# Patient Record
Sex: FEMALE | Race: WHITE | NOT HISPANIC OR LATINO | Employment: FULL TIME | URBAN - METROPOLITAN AREA
[De-identification: names, ages, dates, MRNs, and addresses within clinical notes are randomized per-mention and may not be internally consistent; named-entity substitution may affect disease eponyms.]

---

## 2020-05-08 ENCOUNTER — OFFICE VISIT (OUTPATIENT)
Dept: PREADMISSION TESTING | Facility: HOSPITAL | Age: 45
End: 2020-05-08

## 2020-05-08 LAB
REF LAB TEST METHOD: NORMAL
SARS-COV-2 RNA RESP QL NAA+PROBE: NOT DETECTED

## 2020-05-08 PROCEDURE — U0002 COVID-19 LAB TEST NON-CDC: HCPCS | Performed by: INTERNAL MEDICINE

## 2020-05-08 PROCEDURE — U0004 COV-19 TEST NON-CDC HGH THRU: HCPCS | Performed by: INTERNAL MEDICINE

## 2020-05-11 ENCOUNTER — LAB REQUISITION (OUTPATIENT)
Dept: LAB | Facility: HOSPITAL | Age: 45
End: 2020-05-11

## 2020-05-11 DIAGNOSIS — N93.8 OTHER SPECIFIED ABNORMAL UTERINE AND VAGINAL BLEEDING: ICD-10-CM

## 2020-05-11 PROCEDURE — 88305 TISSUE EXAM BY PATHOLOGIST: CPT | Performed by: OBSTETRICS & GYNECOLOGY

## 2020-05-12 LAB
CYTO UR: NORMAL
LAB AP CASE REPORT: NORMAL
LAB AP CLINICAL INFORMATION: NORMAL
PATH REPORT.FINAL DX SPEC: NORMAL
PATH REPORT.GROSS SPEC: NORMAL

## 2021-08-12 ENCOUNTER — OFFICE VISIT (OUTPATIENT)
Dept: OBSTETRICS AND GYNECOLOGY | Facility: CLINIC | Age: 46
End: 2021-08-12

## 2021-08-12 VITALS
SYSTOLIC BLOOD PRESSURE: 110 MMHG | OXYGEN SATURATION: 99 % | WEIGHT: 113.6 LBS | RESPIRATION RATE: 20 BRPM | HEART RATE: 90 BPM | DIASTOLIC BLOOD PRESSURE: 76 MMHG

## 2021-08-12 DIAGNOSIS — Z12.39 ENCOUNTER FOR BREAST CANCER SCREENING USING NON-MAMMOGRAM MODALITY: ICD-10-CM

## 2021-08-12 DIAGNOSIS — Z80.3 FAMILY HISTORY OF BREAST CANCER: ICD-10-CM

## 2021-08-12 DIAGNOSIS — Z71.85 HPV VACCINE COUNSELING: ICD-10-CM

## 2021-08-12 DIAGNOSIS — Z01.419 ENCOUNTER FOR ANNUAL ROUTINE GYNECOLOGICAL EXAMINATION: Primary | ICD-10-CM

## 2021-08-12 PROCEDURE — 99396 PREV VISIT EST AGE 40-64: CPT | Performed by: OBSTETRICS & GYNECOLOGY

## 2021-08-12 RX ORDER — DOXYCYCLINE HYCLATE 100 MG
100 TABLET ORAL AS NEEDED
COMMUNITY

## 2021-08-12 RX ORDER — HYDROXYZINE HYDROCHLORIDE 10 MG/1
10 TABLET, FILM COATED ORAL 2 TIMES DAILY
COMMUNITY
Start: 2021-08-11

## 2021-08-12 RX ORDER — ONDANSETRON 4 MG/1
4 TABLET, FILM COATED ORAL AS NEEDED
COMMUNITY

## 2021-08-12 RX ORDER — TRAMADOL HYDROCHLORIDE 50 MG/1
50 TABLET ORAL AS NEEDED
COMMUNITY

## 2021-08-12 RX ORDER — LEVOTHYROXINE SODIUM 112 UG/1
112 TABLET ORAL DAILY
COMMUNITY

## 2021-08-12 RX ORDER — HYDROXYCHLOROQUINE SULFATE 200 MG/1
200 TABLET, FILM COATED ORAL DAILY
COMMUNITY

## 2021-08-12 RX ORDER — DICLOFENAC SODIUM 75 MG/1
75 TABLET, DELAYED RELEASE ORAL ONCE AS NEEDED
COMMUNITY

## 2021-08-12 RX ORDER — METHYLPREDNISOLONE 4 MG/1
4 TABLET ORAL DAILY
COMMUNITY
Start: 2021-04-02

## 2021-08-12 RX ORDER — NYSTATIN 500000 [USP'U]/1
500000 TABLET, COATED ORAL AS NEEDED
COMMUNITY

## 2021-08-12 RX ORDER — CYCLOSPORINE 0.5 MG/ML
1 EMULSION OPHTHALMIC 2 TIMES DAILY
COMMUNITY

## 2021-08-12 RX ORDER — AZITHROMYCIN 250 MG/1
250 TABLET, FILM COATED ORAL DAILY
COMMUNITY
Start: 2021-05-17

## 2021-08-12 NOTE — PROGRESS NOTES
GYN Annual Exam     CC - Here for annual exam.     Subjective   HPI  Flor Ley is a 46 y.o. female, , who presents for annual well woman exam. Patient's last menstrual period was 2021 (within days)..  Periods are regular every 25-35 days, lasting 5 days.  Dysmenorrhea:none.  Partner Status: Marital Status: .  New Partners since last visit: no.  Desires STD Screening: no.  Patient has not had the HPV vaccine.     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        History of abnormal Pap smear: yes  Family history of uterine, colon, breast, or ovarian cancer: yes - breast cancer and colon cancer   Previous Mammogram :  yes - 2020 per patient   Performs monthly Self-Breast Exam: yes  Exercises Regularly:yes  Feelings of Anxiety or Depression: no  Tobacco Usage?: No   OB History        1    Para   1    Term                AB        Living   1       SAB        TAB        Ectopic        Molar        Multiple        Live Births   1                Health Maintenance   Topic Date Due   • Annual Gynecologic Pelvic and Breast Exam  Never done   • COLORECTAL CANCER SCREENING  Never done   • ANNUAL PHYSICAL  Never done   • HEPATITIS C SCREENING  Never done   • PAP SMEAR  Never done   • INFLUENZA VACCINE  10/01/2021   • TDAP/TD VACCINES (2 - Td or Tdap) 2022   • COVID-19 Vaccine  Completed   • Pneumococcal Vaccine 0-64  Aged Out           The additional following portions of the patient's history were reviewed and updated as appropriate: allergies, current medications, past family history, past medical history, past social history, past surgical history and problem  list.    Review of Systems   Constitutional: Negative.    HENT: Negative.    Eyes: Negative.    Respiratory: Negative.    Cardiovascular: Negative.    Gastrointestinal: Negative.    Endocrine: Negative.    Genitourinary: Negative.    Musculoskeletal: Negative.    Allergic/Immunologic: Negative.    Neurological: Negative.    Hematological: Negative.    Psychiatric/Behavioral: Negative.        I have reviewed and agree with the HPI, ROS, and historical information as entered above. January Baca MD    Objective   /76   Pulse 90   Resp 20   Wt 51.5 kg (113 lb 9.6 oz)   LMP 07/18/2021 (Within Days)   SpO2 99%   Breastfeeding No     Physical Exam  Vitals and nursing note reviewed. Exam conducted with a chaperone present.   Constitutional:       Appearance: She is well-developed.   HENT:      Head: Normocephalic and atraumatic.   Neck:      Thyroid: No thyroid mass or thyromegaly.   Cardiovascular:      Rate and Rhythm: Normal rate and regular rhythm.      Heart sounds: No murmur heard.     Pulmonary:      Effort: Pulmonary effort is normal. No retractions.      Breath sounds: Normal breath sounds. No wheezing, rhonchi or rales.   Chest:      Chest wall: No mass or tenderness.      Breasts:         Right: Normal. No mass, nipple discharge, skin change or tenderness.         Left: Normal. No mass, nipple discharge, skin change or tenderness.   Abdominal:      General: Bowel sounds are normal.      Palpations: Abdomen is soft. Abdomen is not rigid. There is no mass.      Tenderness: There is no abdominal tenderness. There is no guarding.      Hernia: No hernia is present. There is no hernia in the left inguinal area.   Genitourinary:     Labia:         Right: No rash, tenderness or lesion.         Left: No rash, tenderness or lesion.       Vagina: Normal. No vaginal discharge or lesions.      Cervix: No cervical motion tenderness, discharge, lesion or cervical bleeding.      Uterus: Normal. Not enlarged, not  fixed and not tender.       Adnexa:         Right: No mass or tenderness.          Left: No mass or tenderness.        Rectum: No external hemorrhoid.   Musculoskeletal:      Cervical back: Normal range of motion. No muscular tenderness.   Neurological:      Mental Status: She is alert and oriented to person, place, and time.   Psychiatric:         Behavior: Behavior normal.         Assessment/Plan       Encounter Diagnoses   Name Primary?   • Encounter for annual routine gynecological examination Yes   • HPV vaccine counseling    • Encounter for breast cancer screening using non-mammogram modality        Plan     1. Recommended use of Vitamin D replacement and getting adequate calcium in her diet. (1500mg)  2. Reviewed monthly self breast exams.  Instructed to call with lumps, pain, or breast discharge.    3. Continue yearly mammography  4. Reviewed HPV guidelines and encouraged consideration of HPV vaccine  5. Reviewed exercise as a preventative health measures.    6. FH of breast cancer - discussed option of genetic counseling to see if she would qualify for yearly MRI in addition to mammography.      January Baca MD  08/12/2021

## 2021-08-19 DIAGNOSIS — Z01.419 ENCOUNTER FOR ANNUAL ROUTINE GYNECOLOGICAL EXAMINATION: ICD-10-CM

## 2022-08-16 ENCOUNTER — OFFICE VISIT (OUTPATIENT)
Dept: OBSTETRICS AND GYNECOLOGY | Facility: CLINIC | Age: 47
End: 2022-08-16

## 2022-08-16 VITALS
HEIGHT: 62 IN | SYSTOLIC BLOOD PRESSURE: 106 MMHG | DIASTOLIC BLOOD PRESSURE: 70 MMHG | WEIGHT: 120.4 LBS | BODY MASS INDEX: 22.16 KG/M2

## 2022-08-16 DIAGNOSIS — Z12.39 ENCOUNTER FOR BREAST CANCER SCREENING USING NON-MAMMOGRAM MODALITY: ICD-10-CM

## 2022-08-16 DIAGNOSIS — M35.00 SJOGREN'S SYNDROME, WITH UNSPECIFIED ORGAN INVOLVEMENT: ICD-10-CM

## 2022-08-16 DIAGNOSIS — Z80.3 FAMILY HISTORY OF BREAST CANCER: ICD-10-CM

## 2022-08-16 DIAGNOSIS — D25.1 INTRAMURAL LEIOMYOMA OF UTERUS: ICD-10-CM

## 2022-08-16 DIAGNOSIS — N80.9 ENDOMETRIOSIS: ICD-10-CM

## 2022-08-16 DIAGNOSIS — Z01.419 WOMEN'S ANNUAL ROUTINE GYNECOLOGICAL EXAMINATION: Primary | ICD-10-CM

## 2022-08-16 PROCEDURE — 99396 PREV VISIT EST AGE 40-64: CPT | Performed by: OBSTETRICS & GYNECOLOGY

## 2022-08-16 NOTE — PROGRESS NOTES
Gynecologic Annual Exam Note          GYN Annual Exam     Gynecologic Exam        Subjective     HPI  Flor Ley is a 47 y.o. female, , who presents for annual well woman exam as a established patient . Patient's last menstrual period was 2022..  Periods are irregular occurring every 3-4 weeks, lasting 4-5days. The flow is very light. Dysmenorrhea:mild, occurring premenstrually. . Patient reports problems with: none.  Partner Status: Marital Status: . She is is sexually active. She has not had new partners.. STD testing recommendations have been explained to the patient and she does not desire STD testing. There were no changes to her medical or surgical history since her last visit..       Additional OB/GYN History   Current contraception: contraceptive methods: Condoms  Desires to: do not start contraception    Last Pap : 21. Result: negative. HPV: not done  Last Completed Pap Smear          Ordered - PAP SMEAR (Every 3 Years) Ordered on 2021  SCANNED - PAP SMEAR              History of abnormal Pap smear: yes - cervical dysplasia in 2019  Family history of uterine, colon, breast, or ovarian cancer: yes - Breast CA-PGM, [facility]GM & [facility], Colon CA-PGF  Performs monthly Self-Breast Exam: yes  Last mammogram: 22. Done at .    Last Completed Mammogram          MAMMOGRAM (Yearly) Next due on 3/28/2023    2022  Mammo Screening Digital Tomosynthesis Bilateral With CAD    2021  Mammo Screening Digital Tomosynthesis Bilateral With CAD    2019  Mammo Screening Digital Tomosynthesis Bilateral With CAD    11/15/2018  Mammo Screening Digital Tomosynthesis Bilateral With CAD    2017  Mammo Screening Digital Tomosynthesis Bilateral With CAD    Only the first 5 history entries have been loaded, but more history exists.                History of abnormal mammogram: yes - Diagnostic done in right breast. Repeat scheduled for 2022.    Colonoscopy: has  had a colonoscopy 5 year(s) ago.  Exercises Regularly: yes  Feelings of Anxiety or Depression: no  Tobacco Usage?: No       Current Outpatient Medications:   •  azithromycin (ZITHROMAX) 250 MG tablet, Take 250 mg by mouth Daily., Disp: , Rfl:   •  cycloSPORINE (Restasis) 0.05 % ophthalmic emulsion, Administer 1 drop to both eyes 2 (two) times a day., Disp: , Rfl:   •  diclofenac (VOLTAREN) 75 MG EC tablet, Take 75 mg by mouth 1 (One) Time As Needed., Disp: , Rfl:   •  doxycycline (VIBRAMYICN) 100 MG tablet, Take 100 mg by mouth As Needed., Disp: , Rfl:   •  hydroxychloroquine (PLAQUENIL) 200 MG tablet, Take 200 mg by mouth Daily., Disp: , Rfl:   •  hydrOXYzine (ATARAX) 10 MG tablet, Take 10 mg by mouth 2 (Two) Times a Day., Disp: , Rfl:   •  levothyroxine (SYNTHROID, LEVOTHROID) 112 MCG tablet, Take 112 mcg by mouth Daily., Disp: , Rfl:   •  methylPREDNISolone (MEDROL) 4 MG dose pack, Take 4 mg by mouth Daily., Disp: , Rfl:   •  neomycin-polymyxin-dexamethasone (MAXITROL) 0.1 % ophthalmic suspension, Administer 1 drop to both eyes As Needed., Disp: , Rfl:   •  nystatin (MYCOSTATIN) 321702 units tablet, Take 500,000 Units by mouth As Needed., Disp: , Rfl:   •  ondansetron (ZOFRAN) 4 MG tablet, Take 4 mg by mouth As Needed., Disp: , Rfl:   •  traMADol (ULTRAM) 50 MG tablet, Take 50 mg by mouth As Needed., Disp: , Rfl:      Patient denies the need for medication refills today.    OB History        1    Para   1    Term                AB        Living   1       SAB        IAB        Ectopic        Molar        Multiple        Live Births   1                Past Medical History:   Diagnosis Date   • Abnormal Pap smear of cervix    • Abnormal uterine bleeding (AUB)    • Anemia    • Cervical dysplasia    • Crohn's disease (HCC)    • Disease of thyroid gland    • Endometriosis    • Fibroid     uterine and breast   • Graves disease    • Hypertension    • Interstitial cystitis    • Kidney stone    • MGUS  "(monoclonal gammopathy of unknown significance) 2019   • Migraine    • Osteoarthritis    • Ovarian cyst    • Polycystic ovary syndrome    • Rh incompatibility    • Sjogren's disease (HCC)    • Thyroid cancer (HCC)    • Urinary tract infection    • Varicella    • Vasculitis (HCC)         Past Surgical History:   Procedure Laterality Date   • CYSTOSCOPY W/ DILATION OF BLADDER     • ENDOMETRIAL ABLATION W/ NOVASURE     • TOTAL THYROIDECTOMY     • WISDOM TOOTH EXTRACTION         Health Maintenance   Topic Date Due   • COLORECTAL CANCER SCREENING  Never done   • ANNUAL PHYSICAL  Never done   • COVID-19 Vaccine (3 - Booster for Pfizer series) 10/07/2021   • TDAP/TD VACCINES (2 - Td or Tdap) 01/04/2022   • Annual Gynecologic Pelvic and Breast Exam  08/13/2022   • INFLUENZA VACCINE  10/01/2022   • MAMMOGRAM  03/28/2023   • PAP SMEAR  08/12/2024   • HEPATITIS C SCREENING  Completed   • Pneumococcal Vaccine 0-64  Aged Out       The additional following portions of the patient's history were reviewed and updated as appropriate: allergies, current medications, past family history, past medical history, past social history, past surgical history and problem list.    Review of Systems   Constitutional: Negative.    HENT: Negative.    Eyes: Negative.    Respiratory: Negative.    Cardiovascular: Negative.    Gastrointestinal: Negative.    Endocrine: Negative.    Genitourinary: Negative.    Musculoskeletal: Negative.    Skin: Negative.    Allergic/Immunologic: Negative.    Neurological: Negative.    Hematological: Negative.    Psychiatric/Behavioral: Negative.          I have reviewed and agree with the HPI, ROS, and historical information as entered above. January Baca MD      Objective   /70   Ht 157.5 cm (62\")   Wt 54.6 kg (120 lb 6.4 oz)   LMP 08/16/2022   Breastfeeding No   BMI 22.02 kg/m²     Physical Exam  Vitals and nursing note reviewed. Exam conducted with a chaperone present.   Constitutional:       " Appearance: She is well-developed.   HENT:      Head: Normocephalic and atraumatic.   Neck:      Thyroid: No thyroid mass or thyromegaly.   Cardiovascular:      Rate and Rhythm: Normal rate and regular rhythm.      Heart sounds: No murmur heard.  Pulmonary:      Effort: Pulmonary effort is normal. No retractions.      Breath sounds: Normal breath sounds. No wheezing, rhonchi or rales.   Chest:      Chest wall: No mass or tenderness.   Breasts:      Right: Normal. No mass, nipple discharge, skin change or tenderness.      Left: Normal. No mass, nipple discharge, skin change or tenderness.       Abdominal:      General: Bowel sounds are normal.      Palpations: Abdomen is soft. Abdomen is not rigid. There is no mass.      Tenderness: There is no abdominal tenderness. There is no guarding.      Hernia: No hernia is present. There is no hernia in the left inguinal area.   Genitourinary:     Labia:         Right: No rash, tenderness or lesion.         Left: No rash, tenderness or lesion.       Vagina: Normal. No vaginal discharge or lesions.      Cervix: No cervical motion tenderness, discharge, lesion or cervical bleeding.      Uterus: Normal. Not enlarged, not fixed and not tender.       Adnexa:         Right: No mass or tenderness.          Left: No mass or tenderness.        Rectum: No external hemorrhoid.   Musculoskeletal:      Cervical back: Normal range of motion. No muscular tenderness.   Neurological:      Mental Status: She is alert and oriented to person, place, and time.   Psychiatric:         Behavior: Behavior normal.            Assessment and Plan    Problem List Items Addressed This Visit        Genitourinary and Reproductive     Endometriosis       Multi-system (Lupus, Sarcoid...)    Sjogren's disease (HCC)    Relevant Medications    diclofenac (VOLTAREN) 75 MG EC tablet    methylPREDNISolone (MEDROL) 4 MG dose pack      Other Visit Diagnoses     Women's annual routine gynecological examination    -   Primary    Relevant Orders    LIQUID-BASED PAP SMEAR, [facility] ([facility])    Encounter for breast cancer screening using non-mammogram modality        Intramural leiomyoma of uterus        Relevant Orders    US Non-ob Transvaginal          1. GYN annual well woman exam.   2. Pap guidelines reviewed.  3. Reviewed monthly self breast exams.  Instructed to call with lumps, pain, or breast discharge.     4. Repeat mammogram planned for October due to dense area.  5. Colonoscopy due - she is planning to f/u with Dr. Munguia.  6. H/O uterine leiomyoma and ovarian cysts.  Will f/u for u/s.  7. Family h/o breast cancer - refer to genetic counseling.  8. Return in about 4 weeks (around 9/13/2022), or if symptoms worsen or fail to improve, for US with Next Visit.     January Baca MD  08/16/2022

## 2022-08-17 LAB — REF LAB TEST METHOD: NORMAL

## 2022-09-07 ENCOUNTER — TELEPHONE (OUTPATIENT)
Dept: OBSTETRICS AND GYNECOLOGY | Facility: CLINIC | Age: 47
End: 2022-09-07

## 2022-09-07 NOTE — TELEPHONE ENCOUNTER
[facility]     Call went directly to voicemail. If pt calls back, Dr. Baca has not reviewed her ultrasound yet.

## 2022-09-09 ENCOUNTER — TELEPHONE (OUTPATIENT)
Dept: OBSTETRICS AND GYNECOLOGY | Facility: CLINIC | Age: 47
End: 2022-09-09

## 2022-09-09 NOTE — TELEPHONE ENCOUNTER
Pt. Was seen after her appointment and had an US done. The report is not complete and I am unable to see the results until the US is signed.

## 2022-09-12 DIAGNOSIS — N83.201 CYST OF RIGHT OVARY: ICD-10-CM

## 2022-09-12 DIAGNOSIS — D25.1 LEIOMYOMA, INTRAMURAL: Primary | ICD-10-CM

## 2022-09-28 ENCOUNTER — OFFICE (OUTPATIENT)
Dept: URBAN - METROPOLITAN AREA CLINIC 4 | Facility: CLINIC | Age: 47
End: 2022-09-28

## 2022-09-28 VITALS — HEIGHT: 63 IN | WEIGHT: 119 LBS | SYSTOLIC BLOOD PRESSURE: 104 MMHG | DIASTOLIC BLOOD PRESSURE: 69 MMHG

## 2022-09-28 DIAGNOSIS — R12 HEARTBURN: ICD-10-CM

## 2022-09-28 DIAGNOSIS — K21.9 GASTRO-ESOPHAGEAL REFLUX DISEASE WITHOUT ESOPHAGITIS: ICD-10-CM

## 2022-09-28 DIAGNOSIS — K50.80 CROHN'S DISEASE OF BOTH SMALL AND LARGE INTESTINE WITHOUT CO: ICD-10-CM

## 2022-09-28 DIAGNOSIS — R09.89 OTHER SPECIFIED SYMPTOMS AND SIGNS INVOLVING THE CIRCULATORY: ICD-10-CM

## 2022-09-28 DIAGNOSIS — K58.2 MIXED IRRITABLE BOWEL SYNDROME: ICD-10-CM

## 2022-09-28 DIAGNOSIS — R13.10 DYSPHAGIA, UNSPECIFIED: ICD-10-CM

## 2022-09-28 DIAGNOSIS — R14.0 ABDOMINAL DISTENSION (GASEOUS): ICD-10-CM

## 2022-09-28 PROCEDURE — 99204 OFFICE O/P NEW MOD 45 MIN: CPT | Performed by: INTERNAL MEDICINE

## 2023-01-03 ENCOUNTER — OFFICE VISIT (OUTPATIENT)
Dept: OBSTETRICS AND GYNECOLOGY | Facility: CLINIC | Age: 48
End: 2023-01-03
Payer: COMMERCIAL

## 2023-01-03 VITALS — BODY MASS INDEX: 22.2 KG/M2 | SYSTOLIC BLOOD PRESSURE: 104 MMHG | DIASTOLIC BLOOD PRESSURE: 68 MMHG | WEIGHT: 121.4 LBS

## 2023-01-03 DIAGNOSIS — D25.1 INTRAMURAL LEIOMYOMA OF UTERUS: Primary | ICD-10-CM

## 2023-01-03 DIAGNOSIS — Z98.890 STATUS POST ENDOMETRIAL ABLATION: ICD-10-CM

## 2023-01-03 PROCEDURE — 99213 OFFICE O/P EST LOW 20 MIN: CPT | Performed by: OBSTETRICS & GYNECOLOGY

## 2023-01-03 NOTE — PROGRESS NOTES
Chief Complaint   Patient presents with   • Follow-up       Subjective   HPI  Flor Ley is a 47 y.o. female, . Her last LMP was Patient's last menstrual period was 2022 (exact date), periods are very light spotting.Patient had ablation in  who presents for follow up on leiomyoma, intramural cyst of right ovary. Patient was seen on 22 for annual appointment, at that appointment patient reported irregular cycles. Ultrasound was then ordered for a follow up on 22. After ultrasound on 22, patient was advised to follow up in 4 months for repeat US and appointment.    Today patient reports she is still at times having some pain, that is occasional and random. Patient states it is in the low pelvic area.               Additional OB/GYN History   Last Pap : 22  Last Completed Pap Smear          PAP SMEAR (Every 3 Years) Next due on 2022  LIQUID-BASED PAP SMEAR, [facility] ([facility])    2021  SCANNED - PAP SMEAR                Last mammogram: 10/11/22  Last Completed Mammogram          MAMMOGRAM (Yearly) Next due on 10/11/2023    10/11/2022  Outside Procedure: [facility] TOMOSYNTH[facility] MAMMOGRAPHY    2022  Mammo Screening Digital Tomosynthesis Bilateral With CAD    2021  Mammo Screening Digital Tomosynthesis Bilateral With CAD    2019  Mammo Screening Digital Tomosynthesis Bilateral With CAD    11/15/2018  Mammo Screening Digital Tomosynthesis Bilateral With CAD    Only the first 5 history entries have been loaded, but more history exists.                Tobacco Usage?: No   OB History        1    Para   1    Term                AB        Living   1       SAB        IAB        Ectopic        Molar        Multiple        Live Births   1                  Current Outpatient Medications:   •  azithromycin (ZITHROMAX) 250 MG tablet, Take 250 mg by mouth Daily., Disp: , Rfl:   •  cycloSPORINE (Restasis) 0.05 % ophthalmic emulsion,  Administer 1 drop to both eyes 2 (two) times a day., Disp: , Rfl:   •  diclofenac (VOLTAREN) 75 MG EC tablet, Take 75 mg by mouth 1 (One) Time As Needed., Disp: , Rfl:   •  doxycycline (VIBRAMYICN) 100 MG tablet, Take 100 mg by mouth As Needed., Disp: , Rfl:   •  hydroxychloroquine (PLAQUENIL) 200 MG tablet, Take 200 mg by mouth Daily., Disp: , Rfl:   •  hydrOXYzine (ATARAX) 10 MG tablet, Take 10 mg by mouth 2 (Two) Times a Day., Disp: , Rfl:   •  levothyroxine (SYNTHROID, LEVOTHROID) 112 MCG tablet, Take 112 mcg by mouth Daily., Disp: , Rfl:   •  methylPREDNISolone (MEDROL) 4 MG dose pack, Take 4 mg by mouth Daily., Disp: , Rfl:   •  neomycin-polymyxin-dexamethasone (MAXITROL) 0.1 % ophthalmic suspension, Administer 1 drop to both eyes As Needed., Disp: , Rfl:   •  nystatin (MYCOSTATIN) 893914 units tablet, Take 500,000 Units by mouth As Needed., Disp: , Rfl:   •  ondansetron (ZOFRAN) 4 MG tablet, Take 4 mg by mouth As Needed., Disp: , Rfl:   •  traMADol (ULTRAM) 50 MG tablet, Take 50 mg by mouth As Needed., Disp: , Rfl:      Past Medical History:   Diagnosis Date   • Abnormal Pap smear of cervix    • Abnormal uterine bleeding (AUB)    • Anemia    • Cervical dysplasia    • Crohn's disease (HCC)    • Disease of thyroid gland    • Endometriosis    • Fibroid     uterine and breast   • Graves disease    • Hypertension    • Interstitial cystitis    • Kidney stone    • MGUS (monoclonal gammopathy of unknown significance) 2019   • Migraine    • Osteoarthritis    • Ovarian cyst    • Polycystic ovary syndrome    • Rh incompatibility    • Sjogren's disease (HCC)    • Thyroid cancer (HCC)    • Urinary tract infection    • Varicella    • Vasculitis (HCC)         Past Surgical History:   Procedure Laterality Date   • CYSTOSCOPY W/ DILATION OF BLADDER     • ENDOMETRIAL ABLATION W/ NOVASURE     • TOTAL THYROIDECTOMY     • WISDOM TOOTH EXTRACTION         The additional following portions of the patient's history were reviewed and  updated as appropriate: allergies, current medications, past family history, past medical history, past social history, past surgical history and problem list.    Review of Systems    I have reviewed and agree with the HPI, ROS, and historical information as entered above. January Baca MD    Objective   /68   Wt 55.1 kg (121 lb 6.4 oz)   LMP 12/22/2022 (Exact Date)   BMI 22.20 kg/m²     Physical Exam  Constitutional:       Appearance: She is well-developed.   HENT:      Head: Normocephalic.   Eyes:      Conjunctiva/sclera: Conjunctivae normal.   Pulmonary:      Effort: Pulmonary effort is normal.   Psychiatric:         Behavior: Behavior normal.         Assessment & Plan     Assessment     Problem List Items Addressed This Visit    None  Visit Diagnoses     Intramural leiomyoma of uterus- not seen on todays u/s.    -  Primary    Status post endometrial ablation                  Plan     1.  No clear fibroids seen on todays u/s.  No ovarian cysts.  Endometrium c/w previous ablation.  Discussed findings and for now she feels pain, occ spotting does not warrant further intervention.  If situation worsens can consider hysterectomy.    January Baca MD  01/03/2023

## 2023-08-22 DIAGNOSIS — D25.1 LEIOMYOMA, INTRAMURAL: Primary | ICD-10-CM

## 2023-08-23 ENCOUNTER — OFFICE VISIT (OUTPATIENT)
Dept: OBSTETRICS AND GYNECOLOGY | Facility: CLINIC | Age: 48
End: 2023-08-23
Payer: COMMERCIAL

## 2023-08-23 VITALS
SYSTOLIC BLOOD PRESSURE: 120 MMHG | BODY MASS INDEX: 22.26 KG/M2 | DIASTOLIC BLOOD PRESSURE: 76 MMHG | HEIGHT: 62 IN | WEIGHT: 121 LBS

## 2023-08-23 DIAGNOSIS — Z98.890 STATUS POST ENDOMETRIAL ABLATION: ICD-10-CM

## 2023-08-23 DIAGNOSIS — Z80.3 FAMILY HISTORY OF BREAST CANCER: ICD-10-CM

## 2023-08-23 DIAGNOSIS — Z01.419 WOMEN'S ANNUAL ROUTINE GYNECOLOGICAL EXAMINATION: ICD-10-CM

## 2023-08-23 DIAGNOSIS — Z12.39 ENCOUNTER FOR BREAST CANCER SCREENING USING NON-MAMMOGRAM MODALITY: ICD-10-CM

## 2023-08-23 DIAGNOSIS — N83.202 CYST OF LEFT OVARY: ICD-10-CM

## 2023-08-23 DIAGNOSIS — M35.00 SJOGREN'S SYNDROME, WITH UNSPECIFIED ORGAN INVOLVEMENT: Primary | ICD-10-CM

## 2023-08-23 DIAGNOSIS — Z80.0 FAMILY HISTORY OF COLON CANCER: ICD-10-CM

## 2023-08-23 DIAGNOSIS — N80.9 ENDOMETRIOSIS: ICD-10-CM

## 2023-08-23 DIAGNOSIS — Z80.41 FAMILY HISTORY OF OVARIAN CANCER: ICD-10-CM

## 2023-08-23 RX ORDER — ERYTHROMYCIN 5 MG/G
OINTMENT OPHTHALMIC
COMMUNITY

## 2023-08-23 RX ORDER — FLUCONAZOLE 150 MG/1
TABLET ORAL
COMMUNITY

## 2023-08-23 RX ORDER — LEVOTHYROXINE SODIUM 125 MCG
125 TABLET ORAL DAILY
Qty: 30 TABLET | Refills: 11 | COMMUNITY
Start: 2023-02-10 | End: 2024-02-10

## 2023-08-23 RX ORDER — DULOXETIN HYDROCHLORIDE 20 MG/1
CAPSULE, DELAYED RELEASE ORAL
COMMUNITY
Start: 2023-05-14

## 2023-08-23 RX ORDER — TOBRAMYCIN 3 MG/ML
SOLUTION/ DROPS OPHTHALMIC
COMMUNITY

## 2023-08-23 RX ORDER — LIDOCAINE HYDROCHLORIDE 20 MG/ML
SOLUTION OROPHARYNGEAL
COMMUNITY

## 2023-08-23 RX ORDER — PILOCARPINE HYDROCHLORIDE 5 MG/1
TABLET, FILM COATED ORAL
COMMUNITY

## 2023-08-23 RX ORDER — SODIUM FLUORIDE 5 MG/G
GEL, DENTIFRICE DENTAL
COMMUNITY

## 2023-08-23 RX ORDER — PROPARACAINE HYDROCHLORIDE 5 MG/ML
SOLUTION/ DROPS OPHTHALMIC
COMMUNITY

## 2023-08-23 RX ORDER — DEXAMETHASONE 0.5 MG/5ML
ELIXIR ORAL
COMMUNITY

## 2023-08-23 RX ORDER — AMOXICILLIN 875 MG/1
TABLET, COATED ORAL
COMMUNITY
End: 2023-08-23

## 2023-08-23 RX ORDER — ZOLPIDEM TARTRATE 10 MG/1
10 TABLET ORAL
COMMUNITY

## 2023-08-23 NOTE — PROGRESS NOTES
Gynecologic Annual Exam Note          GYN Annual Exam     Gynecologic Exam (annual)        Subjective     HPI  Flor Ley is a 48 y.o. female, , who presents for annual well woman exam as a established patient. No LMP recorded (lmp unknown). Patient has had an ablation. Patient reports problems with: pelvic pain and some breast tenderness. Patient states pelvic pain has been better since the ablation. Her periods occur every 25-35 days , lasting 3-4 days. The flow is light.. She reports dysmenorrhea is mild, occurring premenstrually. Partner Status: Marital Status: . She is is sexually active. She has not had new partners.. STD testing recommendations have been explained to the patient and she does not desire STD testing. There were no changes to her medical or surgical history since her last visit..     TVUS for uterine fibroids.     Additional OB/GYN History   Current contraception: contraceptive methods: Condoms  Desires to: continue contraception    Last Pap : 2022. Result: negative. HPV: negative.   Last Completed Pap Smear            PAP SMEAR (Every 3 Years) Next due on 2022  LIQUID-BASED PAP SMEAR, [facility] ([facility])    2021  SCANNED - PAP SMEAR                  History of abnormal Pap smear: yes - cervical dysplasia    Family history of uterine, colon, breast, or ovarian cancer: yes - Breast Ca- PGM, MGM, MA x2, & PA x3; Ovarian Ca- PA x2, MA x1;  Uterine Ca- PA; Colon Ca- MGM  Performs monthly Self-Breast Exam: no  Last mammogram: 2022. Done at . F/U on Right breast 10/11/22  Last Completed Mammogram            MAMMOGRAM (Yearly) Next due on 10/11/2023      10/11/2022  MAMMO DIAGNOSTIC DIGITAL TOMOSYNTHESIS RIGHT W CAD    2022  MAMMO DIAGNOSTIC DIGITAL TOMOSYNTHESIS RIGHT W CAD    2022  Mammo Screening Digital Tomosynthesis Bilateral With CAD    2021  Mammo Screening Digital Tomosynthesis Bilateral With CAD    2019   Mammo Screening Digital Tomosynthesis Bilateral With CAD    Only the first 5 history entries have been loaded, but more history exists.                    History of abnormal mammogram: yes - 03/08/22 Following right breast  every 6 months    Colonoscopy: has had a colonoscopy 5 year(s) ago.  Exercises Regularly: no  Feelings of Anxiety or Depression: No  Tobacco Usage?: No       Current Outpatient Medications:     azithromycin (ZITHROMAX) 250 MG tablet, Take 1 tablet by mouth Daily., Disp: , Rfl:     cycloSPORINE (Restasis) 0.05 % ophthalmic emulsion, Administer 1 drop to both eyes 2 (two) times a day., Disp: , Rfl:     dexAMETHasone 0.5 MG/5ML elixir, dexamethasone 0.5 mg/5 mL oral elixir, Disp: , Rfl:     doxycycline (VIBRAMYICN) 100 MG tablet, Take 1 tablet by mouth As Needed., Disp: , Rfl:     erythromycin (ROMYCIN) 5 MG/GM ophthalmic ointment, erythromycin 5 mg/gram (0.5 %) eye ointment, Disp: , Rfl:     fluconazole (DIFLUCAN) 150 MG tablet, fluconazole 150 mg tablet, Disp: , Rfl:     hydroxychloroquine (PLAQUENIL) 200 MG tablet, Take 1 tablet by mouth Daily., Disp: , Rfl:     hydrOXYzine (ATARAX) 10 MG tablet, Take 1 tablet by mouth 2 (Two) Times a Day., Disp: , Rfl:     Lidocaine Viscous HCl (XYLOCAINE) 2 % solution, Lidocaine Viscous 2 % mucosal solution, Disp: , Rfl:     methylPREDNISolone (MEDROL) 4 MG dose pack, Take 1 tablet by mouth Daily., Disp: , Rfl:     neomycin-polymyxin-dexamethasone (MAXITROL) 0.1 % ophthalmic suspension, Administer 1 drop to both eyes As Needed., Disp: , Rfl:     nystatin (MYCOSTATIN) 203499 units tablet, Take 1 tablet by mouth As Needed., Disp: , Rfl:     ondansetron (ZOFRAN) 4 MG tablet, Take 1 tablet by mouth As Needed., Disp: , Rfl:     pilocarpine (SALAGEN) 5 MG tablet, , Disp: , Rfl:     proparacaine (ALCAINE) 0.5 % ophthalmic solution, proparacaine 0.5 % eye drops, Disp: , Rfl:     Sodium Fluoride 1.1 % gel, sodium fluoride 1.1 % dental gel, Disp: , Rfl:     Synthroid  125 MCG tablet, Take 1 tablet by mouth Daily., Disp: 30 tablet, Rfl: 11    tobramycin 0.3 % solution ophthalmic solution, tobramycin 0.3 % eye drops, Disp: , Rfl:     zolpidem (AMBIEN) 10 MG tablet, Take 1 tablet by mouth every night at bedtime., Disp: , Rfl:     diclofenac (VOLTAREN) 75 MG EC tablet, Take 75 mg by mouth 1 (One) Time As Needed. (Patient not taking: Reported on 2023), Disp: , Rfl:     DULoxetine (CYMBALTA) 20 MG capsule, , Disp: , Rfl:     traMADol (ULTRAM) 50 MG tablet, Take 50 mg by mouth As Needed. (Patient not taking: Reported on 2023), Disp: , Rfl:      Patient denies the need for medication refills today.    OB History          1    Para   1    Term                AB        Living   1         SAB        IAB        Ectopic        Molar        Multiple        Live Births   1                Past Medical History:   Diagnosis Date    Abnormal Pap smear of cervix     Abnormal uterine bleeding (AUB)     Anemia     Cervical dysplasia     Crohn's disease     Disease of thyroid gland     Endometriosis     Fibroid     uterine and breast    Graves disease     Hypertension     Interstitial cystitis     Kidney stone     MGUS (monoclonal gammopathy of unknown significance) 2019    Migraine     Osteoarthritis     Ovarian cyst     Polycystic ovary syndrome     Rh incompatibility     Sjogren's disease     Thyroid cancer     Urinary tract infection     Varicella     Vasculitis         Past Surgical History:   Procedure Laterality Date    CYSTOSCOPY W/ DILATION OF BLADDER      ENDOMETRIAL ABLATION W/ NOVASURE      TOTAL THYROIDECTOMY      WISDOM TOOTH EXTRACTION         Health Maintenance   Topic Date Due    COLORECTAL CANCER SCREENING  Never done    ANNUAL PHYSICAL  Never done    COVID-19 Vaccine (3 - Pfizer series) 2021    Annual Gynecologic Pelvic and Breast Exam  2023    INFLUENZA VACCINE  10/01/2023    MAMMOGRAM  10/11/2023    PAP SMEAR  2025    TDAP/TD VACCINES (3 -  "Td or Tdap) 06/08/2033    HEPATITIS C SCREENING  Completed    Pneumococcal Vaccine 0-64  Aged Out       The additional following portions of the patient's history were reviewed and updated as appropriate: allergies, current medications, past family history, past medical history, past social history, past surgical history, and problem list.    Review of Systems   Constitutional: Negative.    HENT: Negative.     Eyes: Negative.    Respiratory: Negative.     Cardiovascular: Negative.    Gastrointestinal: Negative.    Endocrine: Negative.    Genitourinary:  Positive for pelvic pain.        Breast tenderness   Musculoskeletal: Negative.    Skin: Negative.    Allergic/Immunologic: Negative.    Neurological: Negative.    Hematological: Negative.    Psychiatric/Behavioral: Negative.         I have reviewed and agree with the HPI, ROS, and historical information as entered above. January Baca MD          Objective   /76   Ht 157.5 cm (62\")   Wt 54.9 kg (121 lb)   LMP  (LMP Unknown)   BMI 22.13 kg/mý     Physical Exam  Vitals and nursing note reviewed. Exam conducted with a chaperone present.   Constitutional:       Appearance: She is well-developed.   HENT:      Head: Normocephalic and atraumatic.   Neck:      Thyroid: No thyroid mass or thyromegaly.   Cardiovascular:      Rate and Rhythm: Normal rate and regular rhythm.      Heart sounds: No murmur heard.  Pulmonary:      Effort: Pulmonary effort is normal. No retractions.      Breath sounds: Normal breath sounds. No wheezing, rhonchi or rales.   Chest:      Chest wall: No mass or tenderness.   Breasts:     Right: Normal. No mass, nipple discharge, skin change or tenderness.      Left: Normal. No mass, nipple discharge, skin change or tenderness.   Abdominal:      General: Bowel sounds are normal.      Palpations: Abdomen is soft. Abdomen is not rigid. There is no mass.      Tenderness: There is no abdominal tenderness. There is no guarding.      Hernia: No " hernia is present. There is no hernia in the left inguinal area.   Genitourinary:     Labia:         Right: No rash, tenderness or lesion.         Left: No rash, tenderness or lesion.       Vagina: Normal. No vaginal discharge or lesions.      Cervix: No cervical motion tenderness, discharge, lesion or cervical bleeding.      Uterus: Normal. Not enlarged, not fixed and not tender.       Adnexa:         Right: No mass or tenderness.          Left: No mass or tenderness.        Rectum: No external hemorrhoid.   Musculoskeletal:      Cervical back: Normal range of motion. No muscular tenderness.   Neurological:      Mental Status: She is alert and oriented to person, place, and time.   Psychiatric:         Behavior: Behavior normal.          Assessment and Plan    Problem List Items Addressed This Visit          Genitourinary and Reproductive     Endometriosis       Multi-system (Lupus, Sarcoid...)    Sjogren's disease - Primary    Relevant Medications    diclofenac (VOLTAREN) 75 MG EC tablet    methylPREDNISolone (MEDROL) 4 MG dose pack    dexAMETHasone 0.5 MG/5ML elixir     Other Visit Diagnoses       Women's annual routine gynecological examination        Encounter for breast cancer screening using non-mammogram modality        Status post endometrial ablation        FH ovarian        FH breast        FH colon        Cyst of left ovary        Relevant Orders    US Non-ob Transvaginal            GYN annual well woman exam.   Pap guidelines reviewed.  Recommended use of Vitamin D replacement and getting adequate calcium in her diet. (1500mg)  Reviewed monthly self breast exams.  Instructed to call with lumps, pain, or breast discharge.    Continue yearly mammography  Reviewed HPV guidelines.  Reviewed exercise as a preventative health measures.   SHe understands her option for genetic counseling.  U/S today shows Left ovarian cyst- repeat u/s in 6 months.  U/S today reviewed and stable in regards to endometrium and  fibroids not clearly identified today.  Return in about 6 months (around 2/23/2024) for US with Next Visit.     January Baca MD  08/23/2023

## 2024-02-20 ENCOUNTER — OFFICE VISIT (OUTPATIENT)
Dept: OBSTETRICS AND GYNECOLOGY | Facility: CLINIC | Age: 49
End: 2024-02-20
Payer: COMMERCIAL

## 2024-02-20 VITALS
HEIGHT: 62 IN | SYSTOLIC BLOOD PRESSURE: 108 MMHG | WEIGHT: 130.4 LBS | DIASTOLIC BLOOD PRESSURE: 70 MMHG | BODY MASS INDEX: 24 KG/M2

## 2024-02-20 DIAGNOSIS — R63.5 WEIGHT GAIN: ICD-10-CM

## 2024-02-20 DIAGNOSIS — G47.9 SLEEP DISTURBANCE: ICD-10-CM

## 2024-02-20 DIAGNOSIS — L65.9 HAIR LOSS: ICD-10-CM

## 2024-02-20 DIAGNOSIS — N64.4 BREAST TENDERNESS IN FEMALE: ICD-10-CM

## 2024-02-20 DIAGNOSIS — R23.2 HOT FLASHES: ICD-10-CM

## 2024-02-20 DIAGNOSIS — N83.202 CYST OF LEFT OVARY: Primary | ICD-10-CM

## 2024-02-20 RX ORDER — SPIRONOLACTONE 50 MG/1
50 TABLET, FILM COATED ORAL 2 TIMES DAILY
Qty: 60 TABLET | Refills: 6 | Status: SHIPPED | OUTPATIENT
Start: 2024-02-20

## 2024-02-20 NOTE — PROGRESS NOTES
"      Chief Complaint   Patient presents with    Follow-up    Ovarian Cyst         Subjective   HPI  Flor Ley is a 48 y.o. female, , who presents for follow up evaluation of ovarian cyst.      Her last LMP was Patient's last menstrual period was 02/15/2024 (approximate). She was last seen on 2023. At that time the plan was expectant management for management of the cyst. Since her last visit she admits pelvic pain. The patient reports additional symptoms as  weight gain, hair loss, breast tenderness, sleep disturbances, hot flashes, and nail changes. Patient states that she has gradually gained 12-15 pounds in the last 1 year. Patient states that she is having constant breast tenderness. Patient states that the severity of her breast tenderness waxes and wanes. Patient states that she is not able to withstand the feeling of her clothing on her breasts when her tenderness is at its worst. Patient last bilateral diagnostic mammogram was in 2023 and right diagnostic mammogram in 10/2023. Patient states that her breast tenderness began as cyclical, but has became constant. Patient denies any lump, rash, redness, swelling, or dimpling in both breasts. Patient reports difficulty staying asleep. Patient reports averaging 3-4 hours of sleep each night. Patient reports waking up at 1AM each morning from dry eyes, dry throat, hot flashes, diarrhea, and urination. Patient states that her nails are also more brittle and cracking easily. Patient also reports having ridges in her nails.     Patient recently increased Synthroid to 125MCG from 112MG at the beginning on 2024.    Patient tried Ambien for sleep, but reports feeling \"wired\" and tired during the day. Patient states that she tried this for 6-8 weeks. Patient states that her sleep was much improved for the first 1-2 months after she stopped the Ambien as if her body \"reset\" itself.       Did the patient have u/s today? Yes. TVUS revealed an endometrial " lining measuring 2.0MM, normal appearing ovaries, submucosal fibroid, and no evidence of cysts or polyps.     Periods occur monthly lasting 2-3 days, flow is brown spotting, and dysmenorrhea is mild.     Additional OB/GYN History   Last Pap :   Last Completed Pap Smear            PAP SMEAR (Every 3 Years) Next due on 8/16/2025 08/16/2022  LIQUID-BASED PAP SMEAR, [facility] ([facility])    08/12/2021  SCANNED - PAP SMEAR                  History of abnormal Pap smear:  Yes  Tobacco Usage?: No      Current Outpatient Medications:     azithromycin (ZITHROMAX) 250 MG tablet, Take 1 tablet by mouth Daily., Disp: , Rfl:     cycloSPORINE (Restasis) 0.05 % ophthalmic emulsion, Administer 1 drop to both eyes 2 (two) times a day., Disp: , Rfl:     dexAMETHasone 0.5 MG/5ML elixir, dexamethasone 0.5 mg/5 mL oral elixir, Disp: , Rfl:     doxycycline (VIBRAMYICN) 100 MG tablet, Take 1 tablet by mouth As Needed., Disp: , Rfl:     erythromycin (ROMYCIN) 5 MG/GM ophthalmic ointment, erythromycin 5 mg/gram (0.5 %) eye ointment, Disp: , Rfl:     hydroxychloroquine (PLAQUENIL) 200 MG tablet, Take 1 tablet by mouth Daily., Disp: , Rfl:     hydrOXYzine (ATARAX) 10 MG tablet, Take 1 tablet by mouth 2 (Two) Times a Day., Disp: , Rfl:     Lidocaine Viscous HCl (XYLOCAINE) 2 % solution, Lidocaine Viscous 2 % mucosal solution, Disp: , Rfl:     methylPREDNISolone (MEDROL) 4 MG dose pack, Take 1 tablet by mouth Daily., Disp: , Rfl:     neomycin-polymyxin-dexamethasone (MAXITROL) 0.1 % ophthalmic suspension, Administer 1 drop to both eyes As Needed., Disp: , Rfl:     nystatin (MYCOSTATIN) 180986 units tablet, Take 1 tablet by mouth As Needed., Disp: , Rfl:     ondansetron (ZOFRAN) 4 MG tablet, Take 1 tablet by mouth As Needed., Disp: , Rfl:     pilocarpine (SALAGEN) 5 MG tablet, , Disp: , Rfl:     proparacaine (ALCAINE) 0.5 % ophthalmic solution, proparacaine 0.5 % eye drops, Disp: , Rfl:     Sodium Fluoride 1.1 % gel, sodium fluoride 1.1 %  dental gel, Disp: , Rfl:     Synthroid 125 MCG tablet, Take 1 tablet by mouth Daily., Disp: 30 tablet, Rfl: 11    tobramycin 0.3 % solution ophthalmic solution, tobramycin 0.3 % eye drops, Disp: , Rfl:     diclofenac (VOLTAREN) 75 MG EC tablet, Take 75 mg by mouth 1 (One) Time As Needed. (Patient not taking: Reported on 2/20/2024), Disp: , Rfl:     DULoxetine (CYMBALTA) 20 MG capsule, , Disp: , Rfl:     fluconazole (DIFLUCAN) 150 MG tablet, fluconazole 150 mg tablet (Patient not taking: Reported on 2/20/2024), Disp: , Rfl:     traMADol (ULTRAM) 50 MG tablet, Take 50 mg by mouth As Needed. (Patient not taking: Reported on 2/20/2024), Disp: , Rfl:     zolpidem (AMBIEN) 10 MG tablet, Take 1 tablet by mouth every night at bedtime. (Patient not taking: Reported on 2/20/2024), Disp: , Rfl:      Past Medical History:   Diagnosis Date    Abnormal Pap smear of cervix     Abnormal uterine bleeding (AUB)     Anemia     Cervical dysplasia     Crohn's disease     Disease of thyroid gland     Endometriosis     Fibroid     uterine and breast    Graves disease     Hypertension     Interstitial cystitis     Kidney stone     MGUS (monoclonal gammopathy of unknown significance) 2019    Migraine     Osteoarthritis     Ovarian cyst     Polycystic ovary syndrome     Rh incompatibility     Sjogren's disease     Thyroid cancer     Urinary tract infection     Varicella     Vasculitis         Past Surgical History:   Procedure Laterality Date    CYSTOSCOPY W/ DILATION OF BLADDER      ENDOMETRIAL ABLATION W/ NOVASURE      TOTAL THYROIDECTOMY      WISDOM TOOTH EXTRACTION         The additional following portions of the patient's history were reviewed and updated as appropriate: allergies and current medications.    Review of Systems   Constitutional:  Positive for unexpected weight gain.   HENT: Negative.     Eyes: Negative.    Respiratory: Negative.     Cardiovascular: Negative.    Gastrointestinal: Negative.    Endocrine: Positive for heat  "intolerance (Hot flashes).   Genitourinary:  Positive for pelvic pain.        Breast tenderness   Musculoskeletal: Negative.    Skin:         Nail changes   Allergic/Immunologic: Negative.    Neurological: Negative.    Hematological: Negative.    Psychiatric/Behavioral:  Positive for sleep disturbance.      All other systems reviewed and are negative.     I have reviewed and agree with the HPI, ROS, and historical information as entered above. January Baca MD      /70   Ht 157.5 cm (62\")   Wt 59.1 kg (130 lb 6.4 oz)   LMP 02/15/2024 (Approximate)   BMI 23.85 kg/m²     Physical Exam  Vitals and nursing note reviewed. Exam conducted with a chaperone present.   Constitutional:       Appearance: She is well-developed.   HENT:      Head: Normocephalic.   Eyes:      Conjunctiva/sclera: Conjunctivae normal.   Pulmonary:      Effort: Pulmonary effort is normal. No retractions.   Chest:      Chest wall: No mass.   Breasts:     Right: No mass, nipple discharge, skin change or tenderness.      Left: No mass, nipple discharge, skin change or tenderness.   Psychiatric:         Behavior: Behavior normal.       Assessment & Plan     Encounter Diagnoses   Name Primary?    Cyst of left ovary- resolved. Yes    Breast tenderness in female     Hair loss     Sleep disturbance     Weight gain     Hot flashes                U/S reviewed and previous ovarian cyst has resolved.  Breast tenderness - nothing on exam and she is coming up on her next dx mammogram.  Discussed dietary triggers and enc. To try Vit E and Evening Primrose.  Hair loss, change in weight, nail changes, sleep disturbance - she has multiple autoimmune dx and discussed options for managing these symptoms.  In regards to hair loss will start spironolactone.      January Baca MD  02/20/2024   "

## 2024-08-20 ENCOUNTER — OFFICE VISIT (OUTPATIENT)
Dept: OBSTETRICS AND GYNECOLOGY | Facility: CLINIC | Age: 49
End: 2024-08-20
Payer: COMMERCIAL

## 2024-08-20 VITALS
HEIGHT: 62 IN | BODY MASS INDEX: 24.18 KG/M2 | WEIGHT: 131.4 LBS | SYSTOLIC BLOOD PRESSURE: 108 MMHG | DIASTOLIC BLOOD PRESSURE: 72 MMHG

## 2024-08-20 DIAGNOSIS — N95.1 MENOPAUSAL SYMPTOMS: Primary | ICD-10-CM

## 2024-08-20 DIAGNOSIS — N83.202 CYST OF LEFT OVARY: ICD-10-CM

## 2024-08-20 DIAGNOSIS — N89.8 VAGINAL DISCHARGE: ICD-10-CM

## 2024-08-20 DIAGNOSIS — D25.1 INTRAMURAL LEIOMYOMA OF UTERUS: ICD-10-CM

## 2024-08-20 DIAGNOSIS — D25.9 UTERINE LEIOMYOMA, UNSPECIFIED LOCATION: Primary | ICD-10-CM

## 2024-08-20 PROCEDURE — 99213 OFFICE O/P EST LOW 20 MIN: CPT | Performed by: OBSTETRICS & GYNECOLOGY

## 2024-08-20 RX ORDER — DOXEPIN HYDROCHLORIDE 10 MG/1
CAPSULE ORAL
COMMUNITY

## 2024-08-20 RX ORDER — NORTRIPTYLINE HYDROCHLORIDE 10 MG/1
20 CAPSULE ORAL
COMMUNITY
Start: 2024-02-02 | End: 2025-02-01

## 2024-08-20 RX ORDER — PREGABALIN 75 MG/1
CAPSULE ORAL
COMMUNITY

## 2024-08-20 NOTE — PROGRESS NOTES
Chief Complaint   Patient presents with    Follow-up       Subjective   HPI  Flor Ley is a 49 y.o. female, . Her last LMP was No LMP recorded. Patient has had an ablation. She presents for follow up on Hx of fibroid, ovarian cyst, breast tenderness, and hair loss.      At her last visit she was treated with  Spironolactone and encouraged to try Vitamin E or evening Primrose  . She states that she did not start Spironolactone. The patient reports additional symptoms as  vaginal itching and irritation. Patient states that she was recently on antibiotics for pneumonia. Patient states that she has been using Monistat OTC and her symptoms have improved, but still remain present.     TVUS performed today. TVUS revealed an endometrial lining measuring 5.0MM, uterine fibroid measuring 18.4 x 21.6 x 13.8MM, and left ovarian simple cyst measuring 27.7 x 20.3 x 27.9MM.      Additional OB/GYN History     Last Pap :   Last Completed Pap Smear            PAP SMEAR (Every 3 Years) Next due on 2022  LIQUID-BASED PAP SMEAR, [facility] ([facility])    2021  SCANNED - PAP SMEAR                    Last mammogram:   Last Completed Mammogram            MAMMOGRAM (Yearly) Next due on 2024  Mammo Diagnostic Digital Tomosynthesis Bilateral With CAD    10/02/2023  MAMMO DIAGNOSTIC DIGITAL TOMOSYNTHESIS RIGHT W CAD    2023  MAMMO DIAGNOSTIC DIGITAL TOMOSYNTHESIS BILATERAL W CAD    10/11/2022  MAMMO DIAGNOSTIC DIGITAL TOMOSYNTHESIS RIGHT W CAD    2022  MAMMO DIAGNOSTIC DIGITAL TOMOSYNTHESIS RIGHT W CAD    Only the first 5 history entries have been loaded, but more history exists.                    Tobacco Usage?: No   OB History          1    Para   1    Term                AB        Living   1         SAB        IAB        Ectopic        Molar        Multiple        Live Births   1                  Current Outpatient Medications:     azithromycin  (ZITHROMAX) 250 MG tablet, Take 1 tablet by mouth Daily., Disp: , Rfl:     cycloSPORINE (Restasis) 0.05 % ophthalmic emulsion, Administer 1 drop to both eyes 2 (two) times a day., Disp: , Rfl:     dexAMETHasone 0.5 MG/5ML elixir, dexamethasone 0.5 mg/5 mL oral elixir, Disp: , Rfl:     diclofenac (VOLTAREN) 75 MG EC tablet, Take 1 tablet by mouth 1 (One) Time As Needed., Disp: , Rfl:     doxepin (SINEquan) 10 MG capsule, Take 1 capsule twice a day by oral route for 30 days., Disp: , Rfl:     doxycycline (VIBRAMYICN) 100 MG tablet, Take 1 tablet by mouth As Needed., Disp: , Rfl:     erythromycin (ROMYCIN) 5 MG/GM ophthalmic ointment, erythromycin 5 mg/gram (0.5 %) eye ointment, Disp: , Rfl:     hydroxychloroquine (PLAQUENIL) 200 MG tablet, Take 1 tablet by mouth Daily., Disp: , Rfl:     hydrOXYzine (ATARAX) 10 MG tablet, Take 1 tablet by mouth 2 (Two) Times a Day., Disp: , Rfl:     Lidocaine Viscous HCl (XYLOCAINE) 2 % solution, Lidocaine Viscous 2 % mucosal solution, Disp: , Rfl:     methylPREDNISolone (MEDROL) 4 MG dose pack, Take 1 tablet by mouth Daily., Disp: , Rfl:     neomycin-polymyxin-dexamethasone (MAXITROL) 0.1 % ophthalmic suspension, Administer 1 drop to both eyes As Needed., Disp: , Rfl:     nortriptyline (PAMELOR) 10 MG capsule, Take 2 capsules by mouth., Disp: , Rfl:     nystatin (MYCOSTATIN) 333717 units tablet, Take 1 tablet by mouth As Needed., Disp: , Rfl:     ondansetron (ZOFRAN) 4 MG tablet, Take 1 tablet by mouth As Needed., Disp: , Rfl:     pilocarpine (SALAGEN) 5 MG tablet, , Disp: , Rfl:     pregabalin (LYRICA) 75 MG capsule, , Disp: , Rfl:     proparacaine (ALCAINE) 0.5 % ophthalmic solution, proparacaine 0.5 % eye drops, Disp: , Rfl:     Sodium Fluoride 1.1 % gel, sodium fluoride 1.1 % dental gel, Disp: , Rfl:     Synthroid 125 MCG tablet, Take 1 tablet by mouth Daily., Disp: 30 tablet, Rfl: 11    tobramycin 0.3 % solution ophthalmic solution, tobramycin 0.3 % eye drops, Disp: , Rfl:      "traMADol (ULTRAM) 50 MG tablet, Take 1 tablet by mouth As Needed., Disp: , Rfl:     zolpidem (AMBIEN) 10 MG tablet, Take 1 tablet by mouth every night at bedtime., Disp: , Rfl:      Past Medical History:   Diagnosis Date    Abnormal Pap smear of cervix     Abnormal uterine bleeding (AUB)     Anemia     Cervical dysplasia     Crohn's disease     Disease of thyroid gland     Endometriosis     Fibroid     uterine and breast    Graves disease     Hypertension     Interstitial cystitis     Kidney stone     MGUS (monoclonal gammopathy of unknown significance) 2019    Migraine     Osteoarthritis     Ovarian cyst     Polycystic ovary syndrome     Rh incompatibility     Sjogren's disease     Thyroid cancer     Urinary tract infection     Varicella     Vasculitis         Past Surgical History:   Procedure Laterality Date    CYSTOSCOPY W/ DILATION OF BLADDER      ENDOMETRIAL ABLATION W/ NOVASURE      TOTAL THYROIDECTOMY      WISDOM TOOTH EXTRACTION         The additional following portions of the patient's history were reviewed and updated as appropriate: allergies and current medications.    Review of Systems   Constitutional: Negative.    HENT: Negative.     Eyes: Negative.    Respiratory: Negative.     Cardiovascular: Negative.    Gastrointestinal: Negative.    Endocrine: Negative.    Genitourinary:  Positive for vaginal pain.        Vaginal irritation and itching   Musculoskeletal: Negative.    Skin: Negative.    Allergic/Immunologic: Negative.    Hematological: Negative.    Psychiatric/Behavioral: Negative.         I have reviewed and agree with the HPI, ROS, and historical information as entered above. January Baca MD      Objective   /72   Ht 157.5 cm (62\")   Wt 59.6 kg (131 lb 6.4 oz)   BMI 24.03 kg/m²     Physical Exam  Constitutional:       Appearance: She is well-developed.   HENT:      Head: Normocephalic.   Eyes:      Conjunctiva/sclera: Conjunctivae normal.   Pulmonary:      Effort: Pulmonary effort " is normal.   Genitourinary:     General: Normal vulva.      Comments: Nuswab performed    Psychiatric:         Behavior: Behavior normal.         Assessment & Plan     Assessment     Problem List Items Addressed This Visit    None  Visit Diagnoses       Menopausal symptoms    -  Primary    Relevant Orders    Estradiol    Follicle Stimulating Hormone    Vaginal discharge        Relevant Orders    NuSwab VG+, Candida 6sp    Intramural leiomyoma of uterus        Cyst of left ovary                  Plan     Leiomyoma is stable.  She is s/p endometrial ablation.  Weight gain, occ hot flashes - will check menopausal labs.  Vaginal itching - Nu swab sent for atypical yeast.  Ovarian cyst - repeat u/s at annual in 6 months.  Return in about 6 months (around 2/20/2025) for Annual physical, US with Next Visit.        January Baca MD  08/20/2024

## 2024-08-21 LAB
ESTRADIOL SERPL-MCNC: 71 PG/ML
FSH SERPL-ACNC: 2.2 MIU/ML

## 2024-08-23 LAB
A VAGINAE DNA VAG QL NAA+PROBE: NORMAL SCORE
BVAB2 DNA VAG QL NAA+PROBE: NORMAL SCORE
C ALBICANS DNA VAG QL NAA+PROBE: NEGATIVE
C GLABRATA DNA VAG QL NAA+PROBE: NEGATIVE
C KRUSEI DNA VAG QL NAA+PROBE: NEGATIVE
C LUSITANIAE DNA VAG QL NAA+PROBE: NEGATIVE
C TRACH DNA SPEC QL NAA+PROBE: NEGATIVE
CANDIDA DNA VAG QL NAA+PROBE: NEGATIVE
MEGA1 DNA VAG QL NAA+PROBE: NORMAL SCORE
N GONORRHOEA DNA VAG QL NAA+PROBE: NEGATIVE
T VAGINALIS DNA VAG QL NAA+PROBE: NEGATIVE

## 2025-02-26 DIAGNOSIS — N83.202 CYST OF LEFT OVARY: Primary | ICD-10-CM

## 2025-02-27 ENCOUNTER — OFFICE VISIT (OUTPATIENT)
Dept: OBSTETRICS AND GYNECOLOGY | Facility: CLINIC | Age: 50
End: 2025-02-27
Payer: COMMERCIAL

## 2025-02-27 VITALS
HEIGHT: 62 IN | WEIGHT: 134 LBS | SYSTOLIC BLOOD PRESSURE: 116 MMHG | BODY MASS INDEX: 24.66 KG/M2 | DIASTOLIC BLOOD PRESSURE: 84 MMHG

## 2025-02-27 DIAGNOSIS — Z98.890 STATUS POST ENDOMETRIAL ABLATION: ICD-10-CM

## 2025-02-27 DIAGNOSIS — Z12.39 ENCOUNTER FOR BREAST CANCER SCREENING USING NON-MAMMOGRAM MODALITY: ICD-10-CM

## 2025-02-27 DIAGNOSIS — Z01.419 WOMEN'S ANNUAL ROUTINE GYNECOLOGICAL EXAMINATION: Primary | ICD-10-CM

## 2025-02-27 DIAGNOSIS — D25.1 INTRAMURAL LEIOMYOMA OF UTERUS: ICD-10-CM

## 2025-02-27 RX ORDER — ESTRADIOL 0.1 MG/G
CREAM VAGINAL
COMMUNITY

## 2025-02-27 RX ORDER — HYDROCORTISONE 25 MG/G
CREAM TOPICAL
COMMUNITY
Start: 2025-01-01

## 2025-02-27 RX ORDER — ONDANSETRON 4 MG/1
TABLET, ORALLY DISINTEGRATING ORAL
COMMUNITY

## 2025-02-27 NOTE — PROGRESS NOTES
Gynecologic Annual Exam Note          GYN Annual Exam     Gynecologic Exam (Annual & follow up ovarian cyst/)        Subjective     HPI  Flor Ley is a 49 y.o. female, , who presents for annual well woman exam as a established patient. There were no changes to her medical or surgical history since her last visit..  Patient's last menstrual period was 2025 (exact date).  Her periods occur every 28-35 days, lasting 4 days.  The flow is light. She reports dysmenorrhea is mild occurring premenstrually and first 1-2 days of flow. Marital Status: . She is sexually active. She has not had new partners.. STD testing recommendations have been explained to the patient and she declines STD testing.    TVUS performed today to follow up on ovarian cyst. Fibroid measuring 15.0 mm x 15.5 mm x 17.7 mm (submucosal; posterior) noted and right ovarian cyst measuring 30.3 mm x 15.4 mm x 22.3 mm (simple paraovarian cyst adjacent to right ovary).    The patient would like to discuss the following complaints today: none    Additional OB/GYN History   contraceptive methods: Condoms  Desires to: continue contraception  History of migraines: yes with aura    Last Pap : 22. Result: negative. HPV: negative.   Last Completed Pap Smear            Ordered - PAP SMEAR (Every 3 Years) Ordered on 2022  LIQUID-BASED PAP SMEAR, [facility] ([facility])    2021  SCANNED - PAP SMEAR                  History of abnormal Pap smear: yes - several years ago  Family history of uterine, colon, breast, or ovarian cancer: yes - Breast Ca- MGM, PGM, PA x3, & MA x2; Ovarian Ca- MA & PA x2: Uterine Ca- PA; Colon Ca- MGF  Performs monthly Self-Breast Exam: yes  Last mammogram: 24. Done at . There is a copy in the chart.    Last Completed Mammogram            MAMMOGRAM (Every 2 Years) Next due on 2024  MAMMO DIAGNOSTIC DIGITAL TOMOSYNTHESIS BILATERAL W CAD    10/02/2023  MAMMO  DIAGNOSTIC DIGITAL TOMOSYNTHESIS RIGHT W CAD    03/30/2023  MAMMO DIAGNOSTIC DIGITAL TOMOSYNTHESIS BILATERAL W CAD    10/11/2022  MAMMO DIAGNOSTIC DIGITAL TOMOSYNTHESIS RIGHT W CAD    04/05/2022  MAMMO DIAGNOSTIC DIGITAL TOMOSYNTHESIS RIGHT W CAD    Only the first 5 history entries have been loaded, but more history exists.                    Colonoscopy: has had a colonoscopy 8 years ago  Exercises Regularly: no  Feelings of Anxiety or Depression: no  Tobacco Usage?: No       Current Outpatient Medications:     azithromycin (ZITHROMAX) 250 MG tablet, Take 1 tablet by mouth Daily. As needed, Disp: , Rfl:     cycloSPORINE (Restasis) 0.05 % ophthalmic emulsion, Administer 1 drop to both eyes 2 (two) times a day., Disp: , Rfl:     dexAMETHasone 0.5 MG/5ML elixir, As needed, Disp: , Rfl:     diclofenac (VOLTAREN) 75 MG EC tablet, Take 1 tablet by mouth 1 (One) Time As Needed., Disp: , Rfl:     doxepin (SINEquan) 10 MG capsule, As needed, Disp: , Rfl:     doxycycline (VIBRAMYICN) 100 MG tablet, Take 1 tablet by mouth As Needed., Disp: , Rfl:     erythromycin (ROMYCIN) 5 MG/GM ophthalmic ointment, erythromycin 5 mg/gram (0.5 %) eye ointment, Disp: , Rfl:     estradiol (ESTRACE) 0.1 MG/GM vaginal cream, INSERT 1 GRAM INTO VAGINA THREE TIMES A WEEK, Disp: , Rfl:     hydrocortisone 2.5 % cream, APPLY TOPICALLY TO THE AFFECTED AREA TWICE DAILY AS NEEDED FOR IRRITATION OR RASH, Disp: , Rfl:     hydroxychloroquine (PLAQUENIL) 200 MG tablet, Take 1 tablet by mouth Daily., Disp: , Rfl:     hydrOXYzine (ATARAX) 10 MG tablet, Take 1 tablet by mouth 2 (Two) Times a Day. As needed, Disp: , Rfl:     Lidocaine Viscous HCl (XYLOCAINE) 2 % solution, Lidocaine Viscous 2 % mucosal solution, Disp: , Rfl:     methylPREDNISolone (MEDROL) 4 MG dose pack, Take 1 tablet by mouth Daily. As needed, Disp: , Rfl:     neomycin-polymyxin-dexamethasone (MAXITROL) 0.1 % ophthalmic suspension, Administer 1 drop to both eyes As Needed., Disp: , Rfl:      nystatin (MYCOSTATIN) 680286 units tablet, Take 1 tablet by mouth As Needed., Disp: , Rfl:     ondansetron ODT (ZOFRAN-ODT) 4 MG disintegrating tablet, , Disp: , Rfl:     pilocarpine (SALAGEN) 5 MG tablet, , Disp: , Rfl:     proparacaine (ALCAINE) 0.5 % ophthalmic solution, proparacaine 0.5 % eye drops, Disp: , Rfl:     Sodium Fluoride 1.1 % gel, sodium fluoride 1.1 % dental gel, Disp: , Rfl:     tobramycin 0.3 % solution ophthalmic solution, tobramycin 0.3 % eye drops, Disp: , Rfl:     traMADol (ULTRAM) 50 MG tablet, Take 1 tablet by mouth As Needed., Disp: , Rfl:     zolpidem (AMBIEN) 10 MG tablet, Take 1 tablet by mouth every night at bedtime., Disp: , Rfl:     nortriptyline (PAMELOR) 10 MG capsule, Take 2 capsules by mouth., Disp: , Rfl:     Synthroid 125 MCG tablet, Take 1 tablet by mouth Daily., Disp: 30 tablet, Rfl: 11     Patient denies the need for medication refills today.    OB History          1    Para   1    Term   0            AB        Living   1         SAB        IAB        Ectopic        Molar        Multiple        Live Births   1                Past Medical History:   Diagnosis Date    Abnormal Pap smear of cervix     Abnormal uterine bleeding (AUB)     Anemia     Cervical dysplasia     Crohn's disease     Disease of thyroid gland     Endometriosis     Fibroid     uterine and breast    Graves disease     Hypertension     Interstitial cystitis     Kidney stone     MGUS (monoclonal gammopathy of unknown significance) 2019    Migraine     Osteoarthritis     Ovarian cyst     Polycystic ovary syndrome     Rh incompatibility     Sjogren's disease     Thyroid cancer     Urinary tract infection     Varicella     Vasculitis         Past Surgical History:   Procedure Laterality Date    CYSTOSCOPY W/ DILATION OF BLADDER      ENDOMETRIAL ABLATION W/ NOVASURE      TOTAL THYROIDECTOMY      WISDOM TOOTH EXTRACTION         Health Maintenance   Topic Date Due    ANNUAL PHYSICAL  Never done     "INFLUENZA VACCINE  07/01/2024    Annual Gynecologic Pelvic and Breast Exam  08/24/2024    COVID-19 Vaccine (3 - 2024-25 season) 09/01/2024    PAP SMEAR  08/16/2025    MAMMOGRAM  04/04/2026    COLORECTAL CANCER SCREENING  05/22/2027    TDAP/TD VACCINES (3 - Td or Tdap) 06/08/2033    HEPATITIS C SCREENING  Completed    Pneumococcal Vaccine 0-49  Aged Out       The additional following portions of the patient's history were reviewed and updated as appropriate: allergies, current medications, past family history, past medical history, past social history, past surgical history, and problem list.    Review of Systems   Constitutional: Negative.    HENT: Negative.     Eyes: Negative.    Respiratory: Negative.     Cardiovascular: Negative.    Gastrointestinal: Negative.    Endocrine: Negative.    Genitourinary: Negative.    Musculoskeletal: Negative.    Skin: Negative.    Allergic/Immunologic: Negative.    Neurological: Negative.    Hematological: Negative.    Psychiatric/Behavioral: Negative.           I have reviewed and agree with the HPI, ROS, and historical information as entered above. January Baca MD          Objective   /84   Ht 157.5 cm (62\")   Wt 60.8 kg (134 lb)   LMP 02/20/2025 (Exact Date)   BMI 24.51 kg/m²     Physical Exam  Vitals and nursing note reviewed. Exam conducted with a chaperone present.   Constitutional:       Appearance: She is well-developed.   HENT:      Head: Normocephalic and atraumatic.   Neck:      Thyroid: No thyroid mass or thyromegaly.   Cardiovascular:      Rate and Rhythm: Normal rate and regular rhythm.      Heart sounds: No murmur heard.  Pulmonary:      Effort: Pulmonary effort is normal. No retractions.      Breath sounds: Normal breath sounds. No wheezing, rhonchi or rales.   Chest:      Chest wall: No mass or tenderness.   Breasts:     Right: Normal. No mass, nipple discharge, skin change or tenderness.      Left: Normal. No mass, nipple discharge, skin change or " tenderness.   Abdominal:      General: Bowel sounds are normal.      Palpations: Abdomen is soft. Abdomen is not rigid. There is no mass.      Tenderness: There is no abdominal tenderness. There is no guarding.      Hernia: No hernia is present. There is no hernia in the left inguinal area.   Genitourinary:     Labia:         Right: No rash, tenderness or lesion.         Left: No rash, tenderness or lesion.       Vagina: Normal. No vaginal discharge or lesions.      Cervix: No cervical motion tenderness, discharge, lesion or cervical bleeding.      Uterus: Normal. Not enlarged, not fixed and not tender.       Adnexa:         Right: No mass or tenderness.          Left: No mass or tenderness.        Rectum: No external hemorrhoid.   Musculoskeletal:      Cervical back: Normal range of motion. No muscular tenderness.   Neurological:      Mental Status: She is alert and oriented to person, place, and time.   Psychiatric:         Behavior: Behavior normal.            Assessment and Plan    Problem List Items Addressed This Visit    None  Visit Diagnoses       Women's annual routine gynecological examination    -  Primary    Relevant Orders    LIQUID-BASED PAP SMEAR WITH HPV GENOTYPING REGARDLESS OF INTERPRETATION ([facility])    Encounter for breast cancer screening using non-mammogram modality        Intramural leiomyoma of uterus        Relevant Orders    US Non-ob Transvaginal    Status post endometrial ablation                GYN annual well woman exam.   Pap guidelines reviewed.  U/S reviewed.  Stable leiomyoma and 3 cm paratubal cyst.   Ordered Mammogram today.  Reviewed self breast awareness.  Instructed to call with lumps, pain, or breast discharge.     Perimenopausal guidance given.  Recommended use of Vitamin D replacement and getting adequate calcium in her diet. (1500mg)  Return in about 1 year (around 2/27/2026) for US with Next Visit.     January Baca MD  02/27/2025

## 2025-02-28 LAB — REF LAB TEST METHOD: NORMAL
